# Patient Record
Sex: FEMALE | Race: BLACK OR AFRICAN AMERICAN | NOT HISPANIC OR LATINO | URBAN - METROPOLITAN AREA
[De-identification: names, ages, dates, MRNs, and addresses within clinical notes are randomized per-mention and may not be internally consistent; named-entity substitution may affect disease eponyms.]

---

## 2017-01-14 ENCOUNTER — EMERGENCY (EMERGENCY)
Facility: HOSPITAL | Age: 32
LOS: 1 days | Discharge: HOME | End: 2017-01-14
Admitting: EMERGENCY MEDICINE
Payer: MEDICAID

## 2017-01-14 VITALS
DIASTOLIC BLOOD PRESSURE: 85 MMHG | RESPIRATION RATE: 16 BRPM | TEMPERATURE: 98 F | HEART RATE: 72 BPM | SYSTOLIC BLOOD PRESSURE: 117 MMHG

## 2017-01-14 PROCEDURE — 99283 EMERGENCY DEPT VISIT LOW MDM: CPT | Mod: 25

## 2017-01-14 PROCEDURE — 10060 I&D ABSCESS SIMPLE/SINGLE: CPT

## 2017-01-14 NOTE — ED PROCEDURE NOTE - CPROC ED POST PROC CARE GUIDE1
Keep the cast/splint/dressing clean and dry./Instructed patient/caregiver to follow-up with primary care physician./Instructed patient/caregiver regarding signs and symptoms of infection./Verbal/written post procedure instructions were given to patient/caregiver.

## 2017-01-14 NOTE — ED PROVIDER NOTE - PHYSICAL EXAMINATION
Left Thumb: erythema and edema of eponychium and hyponychium. Extreme tenderness to palpation. Limited flexion of thumb due to pain. Normal extension, abduction and adduction range of motion.

## 2017-01-14 NOTE — ED PROVIDER NOTE - MEDICAL DECISION MAKING DETAILS
30 y/o female with no PMH with Paronychia of left thumb. I&D procedure completed at the bedside. Pt discharged with PO Bactrim x 7 days. Pt instructed to soak the thumb daily in hot soapy water for 15mins. Pt should discontinue ETOH use while on Bactrim. May take OTC Tylenol or Motrin for pain prn.

## 2017-01-14 NOTE — ED PROVIDER NOTE - CARE PLAN
Principal Discharge DX:	Paronychia of thumb, left  Instructions for follow-up, activity and diet:	Return to ER if symptoms worsen or persist.

## 2017-01-14 NOTE — ED ADULT TRIAGE NOTE - CHIEF COMPLAINT QUOTE
left thumb infection  started two days ago  pt states she was using a household  when her thumb began to pain  today the pain spreading to second finger  the thumb nail is swollen